# Patient Record
Sex: FEMALE | URBAN - METROPOLITAN AREA
[De-identification: names, ages, dates, MRNs, and addresses within clinical notes are randomized per-mention and may not be internally consistent; named-entity substitution may affect disease eponyms.]

---

## 2020-12-14 ENCOUNTER — TELEMEDICINE (OUTPATIENT)
Dept: FAMILY MEDICINE CLINIC | Facility: TELEHEALTH | Age: 30
End: 2020-12-14

## 2020-12-14 DIAGNOSIS — R21 RASH: Primary | ICD-10-CM

## 2020-12-14 DIAGNOSIS — L29.9 PRURITUS: ICD-10-CM

## 2020-12-14 PROCEDURE — 99203 OFFICE O/P NEW LOW 30 MIN: CPT | Performed by: NURSE PRACTITIONER

## 2020-12-14 RX ORDER — HYDROXYZINE HYDROCHLORIDE 25 MG/1
25 TABLET, FILM COATED ORAL 3 TIMES DAILY PRN
Qty: 20 TABLET | Refills: 0 | Status: SHIPPED | OUTPATIENT
Start: 2020-12-14

## 2020-12-14 RX ORDER — PERMETHRIN 50 MG/G
CREAM TOPICAL ONCE
Qty: 60 G | Refills: 0 | Status: SHIPPED | OUTPATIENT
Start: 2020-12-14 | End: 2020-12-14

## 2020-12-14 NOTE — PROGRESS NOTES
Subjective   Chief Complaint   Patient presents with   • Rash     This was a video visit, I spent a total of 25 minutes reviewing this chart.     Kati Jean is a 30 y.o. female.     Pt reports having a pruritic rash on the dorsal surface of bilateral hands and generalized itching all over her body. She also reports pruritis and rash is also in her mouth, around her lips and near anal area. She reports seeing bugs and worms crawl out of her skin from different places, they are clear or white and when she burns then they turn brown and tan. Denies rash between fingers, toes, waistline or wrists. Denies other contacts with similar symptoms. She thinks she has scabies and is requesting oral medication to treat scabies.     Rash  This is a new problem. The current episode started in the past 7 days. The problem has been gradually worsening since onset. The affected locations include the right hand, left hand and lips. The rash is characterized by itchiness and redness. It is unknown if there was an exposure to a precipitant. Pertinent negatives include no anorexia, congestion, cough, diarrhea, eye pain, facial edema, fatigue, fever, joint pain, nail changes, rhinorrhea, shortness of breath, sore throat or vomiting. Treatments tried: aloe vera gel, scrubbed skin with hot water. The treatment provided no relief.        No Known Allergies    History reviewed. No pertinent past medical history.    History reviewed. No pertinent surgical history.    Social History     Socioeconomic History   • Marital status: Single     Spouse name: Not on file   • Number of children: Not on file   • Years of education: Not on file   • Highest education level: Not on file   Tobacco Use   • Smoking status: Never Smoker   • Smokeless tobacco: Never Used   Substance and Sexual Activity   • Alcohol use: Defer   • Drug use: Defer   • Sexual activity: Defer       History reviewed. No pertinent family history.      Current Outpatient  Medications:   •  hydrOXYzine (ATARAX) 25 MG tablet, Take 1 tablet by mouth 3 (Three) Times a Day As Needed for Itching., Disp: 20 tablet, Rfl: 0  •  permethrin (ELIMITE) 5 % cream, Apply  topically to the appropriate area as directed 1 (One) Time for 1 dose. Apply from the neck down, leave on for 8-14 hours, then wash off, Disp: 60 g, Rfl: 0      Review of Systems   Constitutional: Negative for chills, diaphoresis, fatigue and fever.   HENT: Negative for congestion, rhinorrhea and sore throat.    Eyes: Negative for pain.   Respiratory: Negative for cough and shortness of breath.    Gastrointestinal: Negative for anorexia, diarrhea and vomiting.   Musculoskeletal: Negative for joint pain.   Skin: Positive for rash. Negative for nail changes.   Neurological: Negative for headache.        There were no vitals filed for this visit.    Objective   Physical Exam  Constitutional:       General: She is not in acute distress.     Appearance: Normal appearance. She is not ill-appearing, toxic-appearing or diaphoretic.   HENT:      Head: Normocephalic and atraumatic.   Pulmonary:      Effort: Pulmonary effort is normal.   Skin:     Findings: Rash present.          Neurological:      Mental Status: She is alert and oriented to person, place, and time.   Psychiatric:         Mood and Affect: Mood is anxious.         Speech: Speech normal.      Comments: Pt appeared anxious during the visit          Procedures     Assessment/Plan   Diagnoses and all orders for this visit:    1. Rash (Primary)    2. Pruritus  -     hydrOXYzine (ATARAX) 25 MG tablet; Take 1 tablet by mouth 3 (Three) Times a Day As Needed for Itching.  Dispense: 20 tablet; Refill: 0  -     permethrin (ELIMITE) 5 % cream; Apply  topically to the appropriate area as directed 1 (One) Time for 1 dose. Apply from the neck down, leave on for 8-14 hours, then wash off  Dispense: 60 g; Refill: 0      Take medicine as prescribed.   You may use over the counter  hydrocortisone for rash on hands.   If symptoms worsen or do not improve follow up with your PCP or visit your nearest Urgent Care Center or ER.      No results found for this or any previous visit.    PLAN: Advised pt that her symptoms are not consistent with scabies, will prescribe permethrin cream per her request along with medication to calm pruritis. Advised if symptoms do not improve or worsen she needs to be seen in person for evaluation.  Discussed dosing, side effects, recommended other symptomatic care.  Patient should follow up with primary care provider if symptoms worsen, fail to resolve or other symptoms need attention. Patient/family agree to the above.     Angeles Davis, APRN

## 2020-12-15 NOTE — PATIENT INSTRUCTIONS
Take medicine as prescribed.   You may use over the counter hydrocortisone for rash on hands.   If symptoms worsen or do not improve follow up with your PCP or visit your nearest Urgent Care Center or ER.        Scabies, Adult    Scabies is a skin condition that happens when very small insects get under the skin (infestation). This causes a rash and severe itchiness. Scabies can spread from person to person (is contagious). If you get scabies, it is common for others in your household to get scabies too.  With proper treatment, symptoms usually go away in 2-4 weeks. Scabies usually does not cause lasting problems.  What are the causes?  This condition is caused by tiny mites (Sarcoptes scabiei, or human itch mites) that can only be seen with a microscope. The mites get into the top layer of skin and lay eggs. Scabies can spread from person to person through:  · Close contact with a person who has scabies.  · Sharing or having contact with infested items, such as towels, bedding, or clothing.  What increases the risk?  The following factors may make you more likely to develop this condition:  · Living in a nursing home or other extended care facility.  · Having sexual contact with a partner who has scabies.  · Caring for others who are at increased risk for scabies.  What are the signs or symptoms?  Symptoms of this condition include:  · Severe itchiness. This is often worse at night.  · A rash that includes tiny red bumps or blisters. The rash commonly occurs on the hands, wrists, elbows, armpits, chest, waist, groin, or buttocks. The bumps may form a line (burrow) in some areas.  · Skin irritation. This can include scaly patches or sores.  How is this diagnosed?  This condition may be diagnosed based on:  · A physical exam of the skin.  · A skin test. Your health care provider may take a sample of your affected skin (skin scraping) and have it examined under a microscope for signs of mites.  How is this  treated?  This condition may be treated with:  · Medicated cream or lotion that kills the mites. This is spread on the entire body and left on for several hours. Usually, one treatment with medicated cream or lotion is enough to kill all the mites. In severe cases, the treatment may need to be repeated.  · Medicated cream that relieves itching.  · Medicines taken by mouth (orally) that:  ? Relieve itching.  ? Reduce the swelling and redness.  ? Kill the mites. This treatment may be done in severe cases.  Follow these instructions at home:  Medicines    · Take or apply over-the-counter and prescription medicines as told by your health care provider.  · Apply medicated cream or lotion as told by your health care provider.  · Do not wash off the medicated cream or lotion until the necessary amount of time has passed.  Skin care    · Avoid scratching the affected areas of your skin.  · Keep your fingernails closely trimmed to reduce injury from scratching.  · Take cool baths or apply cool washcloths to your skin to help reduce itching.  General instructions  · Clean all items that you recently had contact with, including bedding, clothing, and furniture. Do this on the same day that you start treatment.  ? Dry clean items, or use hot water to wash items. Dry items on the hot dry cycle.  ? Place items that cannot be washed into closed, airtight plastic bags for at least 3 days. The mites cannot live for more than 3 days away from human skin.  ? Vacuum furniture and mattresses that you use.  · Make sure that other people who may have been infested are examined by a health care provider. These include members of your household and anyone who may have had contact with infested items.  · Keep all follow-up visits as told by your health care provider. This is important.  Contact a health care provider if:  · You have itching that does not go away after 4 weeks of treatment.  · You continue to develop new bumps or  burrows.  · You have redness, swelling, or pain in your rash area after treatment.  · You have fluid, blood, or pus coming from your rash.  Summary  · Scabies is a skin condition that causes a rash and severe itchiness.  · This condition is caused by tiny mites that get into the top layer of the skin and lay eggs.  · Scabies can spread from person to person.  · Follow treatments as recommended by your health care provider.  · Clean all items that you recently had contact with.  This information is not intended to replace advice given to you by your health care provider. Make sure you discuss any questions you have with your health care provider.  Document Revised: 10/23/2019 Document Reviewed: 10/23/2019  [employer] Patient Education © 2020 [employer]    Pruritus  Pruritus is an itchy feeling on the skin. One of the most common causes is dry skin, but many different things can cause itching. Most cases of itching do not require medical attention. Sometimes itchy skin can turn into a rash.  Follow these instructions at home:  Skin care    · Apply moisturizing lotion to your skin as needed. Lotion that contains petroleum jelly is best.  · Take medicines or apply medicated creams only as told by your health care provider. This may include:  ? Corticosteroid cream.  ? Anti-itch lotions.  ? Oral antihistamines.  · Apply a cool, wet cloth (cool compress) to the affected areas.  · Take baths with one of the following:  ? Epsom salts. You can get these at your local pharmacy or grocery store. Follow the instructions on the packaging.  ? Baking soda. Pour a small amount into the bath as told by your health care provider.  ? Colloidal oatmeal. You can get this at your local pharmacy or grocery store. Follow the instructions on the packaging.  · Apply baking soda paste to your skin. To make the paste, stir water into a small amount of baking soda until it reaches a paste-like consistency.  · Do not scratch your skin.  · Do  not take hot showers or baths, which can make itching worse. A cool shower may help with itching as long as you apply moisturizing lotion after the shower.  · Do not use scented soaps, detergents, perfumes, and cosmetic products. Instead, use gentle, unscented versions of these items.  General instructions  · Avoid wearing tight clothes.  · Keep a journal to help find out what is causing your itching. Write down:  ? What you eat and drink.  ? What cosmetic products you use.  ? What soaps or detergents you use.  ? What you wear, including jewelry.  · Use a humidifier. This keeps the air moist, which helps to prevent dry skin.  · Be aware of any changes in your itchiness.  Contact a health care provider if:  · The itching does not go away after several days.  · You are unusually thirsty or urinating more than normal.  · Your skin tingles or feels numb.  · Your skin or the white parts of your eyes turn yellow (jaundice).  · You feel weak.  · You have any of the following:  ? Night sweats.  ? Tiredness (fatigue).  ? Weight loss.  ? Abdominal pain.  Summary  · Pruritus is an itchy feeling on the skin. One of the most common causes is dry skin, but many different conditions and factors can cause itching.  · Apply moisturizing lotion to your skin as needed. Lotion that contains petroleum jelly is best.  · Take medicines or apply medicated creams only as told by your health care provider.  · Do not take hot showers or baths. Do not use scented soaps, detergents, perfumes, or cosmetic products.  This information is not intended to replace advice given to you by your health care provider. Make sure you discuss any questions you have with your health care provider.  Document Revised: 01/01/2019 Document Reviewed: 01/01/2019  [employer] Patient Education © 2020 [employer]

## 2024-03-26 ENCOUNTER — HOSPITAL ENCOUNTER (OUTPATIENT)
Facility: HOSPITAL | Age: 34
Discharge: LEFT BEFORE DISCHARGE | End: 2024-03-26
Attending: EMERGENCY MEDICINE | Admitting: EMERGENCY MEDICINE

## 2024-03-26 VITALS
TEMPERATURE: 98.1 F | HEIGHT: 57 IN | DIASTOLIC BLOOD PRESSURE: 54 MMHG | BODY MASS INDEX: 22.39 KG/M2 | RESPIRATION RATE: 18 BRPM | HEART RATE: 107 BPM | WEIGHT: 103.8 LBS | SYSTOLIC BLOOD PRESSURE: 96 MMHG | OXYGEN SATURATION: 98 %

## 2024-03-26 DIAGNOSIS — R63.4 LOSS OF WEIGHT: Primary | ICD-10-CM

## 2024-03-26 PROCEDURE — G0463 HOSPITAL OUTPT CLINIC VISIT: HCPCS | Performed by: NURSE PRACTITIONER

## 2024-03-26 RX ORDER — SODIUM CHLORIDE 0.9 % (FLUSH) 0.9 %
10 SYRINGE (ML) INJECTION AS NEEDED
Status: DISCONTINUED | OUTPATIENT
Start: 2024-03-26 | End: 2024-03-26 | Stop reason: HOSPADM

## 2024-03-26 NOTE — ED NOTES
Patient states that when she goes to the bathroom she feels like there is white mucous or something else floating in her bowel movement.  The patient also states that she feels very nauseated all the time. Patient states that this all started around Janurary of this year.

## 2024-03-26 NOTE — FSED PROVIDER NOTE
Subjective   History of Present Illness  The patient is a 34-year-old female who presents to the ER with a 20-30 pound weight loss, nausea, every time she eats, since December.  Denies any fevers, reports abdomen pain, but thinks it is because she just started her period.      History provided by:  Patient   used: No        Review of Systems   Constitutional:  Positive for fatigue and unexpected weight change.   Gastrointestinal:  Positive for nausea.       History reviewed. No pertinent past medical history.    Allergies   Allergen Reactions    Penicillins Unknown - Low Severity       History reviewed. No pertinent surgical history.    History reviewed. No pertinent family history.    Social History     Socioeconomic History    Marital status: Single   Tobacco Use    Smoking status: Never    Smokeless tobacco: Never   Vaping Use    Vaping status: Never Used   Substance and Sexual Activity    Alcohol use: Defer    Drug use: Defer    Sexual activity: Defer           Objective   Physical Exam  Vitals and nursing note reviewed.   Constitutional:       Appearance: She is underweight.      Comments: Patient with 20-30 pound weight loss in the since December, reports nausea after eating.    HENT:      Head: Normocephalic.      Mouth/Throat:      Lips: Pink.      Mouth: Mucous membranes are moist.      Pharynx: Oropharynx is clear. Uvula midline.   Eyes:      Extraocular Movements: Extraocular movements intact.      Conjunctiva/sclera: Conjunctivae normal.      Pupils: Pupils are equal, round, and reactive to light.   Cardiovascular:      Rate and Rhythm: Normal rate and regular rhythm.      Pulses: Normal pulses.      Heart sounds: Normal heart sounds.   Pulmonary:      Effort: Pulmonary effort is normal.      Breath sounds: Normal breath sounds.   Abdominal:      General: Bowel sounds are normal.      Palpations: Abdomen is soft.      Comments: Abdomen soft non tender on exam    Genitourinary:      Comments: Patient reports she started her period while her in the ER.   Musculoskeletal:         General: Normal range of motion.   Skin:     General: Skin is warm and dry.   Neurological:      General: No focal deficit present.      Mental Status: She is alert and oriented to person, place, and time.   Psychiatric:         Mood and Affect: Mood normal.         Behavior: Behavior normal. Behavior is cooperative.         Procedures           ED Course  ED Course as of 03/26/24 2005   Tue Mar 26, 2024   1901 Advised patient we need to do bloodwork and CT scan to see what is going on, patient spouse reports that he made me come, he reports he had to drag her here. Advised patient if we do not find anything, she will have to see PCP.     Patient out to desk and wants to sign out AMA, given patient connection number to get a primary care physician.  [DS]      ED Course User Index  [DS] Karyn Whelan APRN                                           Medical Decision Making  The patient is a 34-year-old female who presents to the ER with a 20-30 pound weight loss, nausea, every time she eats, since December.  Denies any fevers, reports abdomen pain, but thinks it is because she just started her period.      Patient out to desk, states she just wants to sign out, and could I give her the patient connection number so she can get a primary care physician.         Final diagnoses:   Loss of weight       ED Disposition  ED Disposition       ED Disposition   AMA    Condition   --    Comment   --               No follow-up provider specified.       Medication List      No changes were made to your prescriptions during this visit.